# Patient Record
Sex: MALE | Race: WHITE
[De-identification: names, ages, dates, MRNs, and addresses within clinical notes are randomized per-mention and may not be internally consistent; named-entity substitution may affect disease eponyms.]

---

## 2021-06-14 ENCOUNTER — HOSPITAL ENCOUNTER (OUTPATIENT)
Dept: HOSPITAL 75 - PREOP | Age: 51
LOS: 1 days | Discharge: HOME | End: 2021-06-15
Attending: SURGERY
Payer: COMMERCIAL

## 2021-06-14 VITALS — HEIGHT: 75 IN | WEIGHT: 237.44 LBS | BODY MASS INDEX: 29.52 KG/M2

## 2021-06-14 DIAGNOSIS — Z01.818: Primary | ICD-10-CM

## 2021-06-21 ENCOUNTER — HOSPITAL ENCOUNTER (OUTPATIENT)
Dept: HOSPITAL 75 - ENDO | Age: 51
Discharge: HOME | End: 2021-06-21
Attending: SURGERY
Payer: COMMERCIAL

## 2021-06-21 VITALS — HEIGHT: 74.8 IN | WEIGHT: 237.44 LBS | BODY MASS INDEX: 29.83 KG/M2

## 2021-06-21 VITALS — SYSTOLIC BLOOD PRESSURE: 112 MMHG | DIASTOLIC BLOOD PRESSURE: 78 MMHG

## 2021-06-21 VITALS — SYSTOLIC BLOOD PRESSURE: 112 MMHG | DIASTOLIC BLOOD PRESSURE: 62 MMHG

## 2021-06-21 VITALS — DIASTOLIC BLOOD PRESSURE: 64 MMHG | SYSTOLIC BLOOD PRESSURE: 107 MMHG

## 2021-06-21 VITALS — SYSTOLIC BLOOD PRESSURE: 152 MMHG | DIASTOLIC BLOOD PRESSURE: 95 MMHG

## 2021-06-21 VITALS — DIASTOLIC BLOOD PRESSURE: 78 MMHG | SYSTOLIC BLOOD PRESSURE: 112 MMHG

## 2021-06-21 DIAGNOSIS — K63.5: Primary | ICD-10-CM

## 2021-06-21 DIAGNOSIS — K64.8: ICD-10-CM

## 2021-06-21 DIAGNOSIS — Z79.899: ICD-10-CM

## 2021-06-21 DIAGNOSIS — I10: ICD-10-CM

## 2021-06-21 PROCEDURE — 88305 TISSUE EXAM BY PATHOLOGIST: CPT

## 2021-06-21 NOTE — ENDOSCOPY DISCHARGE INSTRUCT
Endo Procedure/Findings


Findings


1.:  Polyp


2.:  Internal Hemorrhoids





Discharge Instructions


-


Activity: You might feel a little sleepy until tomorrow.  This is due to the 

medicine you received to relax you.





Until tomorrow, you should:  


   NOT drive a car, operate machinery or power tools.


   NOT drink any alcoholic beverages.


   NOT make any important decisions or sign importortant papers.





Do not return to work until tomorrow, unless otherwise instructed. Resume 

previous activities tomorrow.





Diet: Start by taking liquids.  If you tolerate liquids, advance to solid food.


1.:  Colonscopy in 5 years





Notify Physician


-


If you experience excessive bleeding, unusual abdominal pain, fever, or chest 

pain, contact your doctor immediately.











ELIAZAR HADDAD DO               Jun 21, 2021 10:55

## 2021-06-21 NOTE — ANESTHESIA-GENERAL POST-OP
MAC


Patient Condition


Mental Status/LOC:  Same as Preop


Cardiovascular:  Satisfactory


Nausea/Vomiting:  Absent


Respiratory:  Satisfactory


Pain:  Controlled


Complications:  Absent





Post Op Complications


Complications


None





Follow Up Care/Instructions


Patient Instructions


None needed.





Anesthesiology Discharge Order


Discharge Order


Patient was seen after the procedure and he was doing well, no complaints, 

stable vital signs, no apparent adverse anesthesia problems.











SOLOMON MOCK 21, 2021 12:16

## 2021-06-21 NOTE — PROGRESS NOTE-POST OPERATIVE
Post-Operative Progess Note


Surgeon (s)/Assistant (s)


Surgeon


ELIAZAR HADDAD DO


Assistant:  none





Pre-Operative Diagnosis


+Cologuard





Post-Operative Diagnosis





polyp


int hemorrhoids





Procedure & Operative Findings


Date of Procedure


6/21/21


Procedure Performed/Findings


PROCEDURE NOTE:


After informed consent was obtained, the patient was brought to the endoscopy


suite, placed in bed in left lateral decubitus position.  He  was administered


IV sedation by the CRNA who then monitored his vitals the entire time, heart


rate, blood pressure and pulse ox, started with the colonoscopy.  


Pushed all the way to the cecum about 150 cm in, took a picture of the 

appendiceal orifice


and noted the ileo-cecal valve. Then slowly withdrew the scope insufflating to 

look circumferentially


at the walls looking at the cecum, up the ascending colon to the hepatic 

flexure, down the


transverse colon, to the splenic flexure, into the descending colon and down 

into the


sigmoid.  Saw a small polyp in the sigmoid and did a hot biopsy to remove it. 


Finally into the rectum, retroflexed in the rectal vault, saw sone minimal 

internal 


hemorrhoids and took a picture.  Then removed the scope.


The patient tolerated the procedure.  He  was recovered in endoscopy suite.


Anesthesia Type


IV sedation by CRNA





Estimated Blood Loss


Estimated blood loss (mL):  scant





Specimens/Packing


Specimens Removed


sigmoid polyp











ELIAZAR HADDAD DO               Jun 21, 2021 10:55